# Patient Record
Sex: FEMALE | Race: WHITE | NOT HISPANIC OR LATINO | Employment: OTHER | URBAN - METROPOLITAN AREA
[De-identification: names, ages, dates, MRNs, and addresses within clinical notes are randomized per-mention and may not be internally consistent; named-entity substitution may affect disease eponyms.]

---

## 2022-06-06 ENCOUNTER — OFFICE VISIT (OUTPATIENT)
Dept: CARDIOLOGY | Facility: CLINIC | Age: 72
End: 2022-06-06

## 2022-06-06 VITALS
HEIGHT: 68 IN | BODY MASS INDEX: 43.04 KG/M2 | SYSTOLIC BLOOD PRESSURE: 118 MMHG | DIASTOLIC BLOOD PRESSURE: 68 MMHG | WEIGHT: 284 LBS | OXYGEN SATURATION: 98 % | HEART RATE: 74 BPM

## 2022-06-06 DIAGNOSIS — Z01.810 PREOPERATIVE CARDIOVASCULAR EXAMINATION: Primary | ICD-10-CM

## 2022-06-06 DIAGNOSIS — E66.01 MORBID OBESITY WITH BMI OF 40.0-44.9, ADULT: ICD-10-CM

## 2022-06-06 DIAGNOSIS — E78.5 DYSLIPIDEMIA: ICD-10-CM

## 2022-06-06 DIAGNOSIS — R06.09 DOE (DYSPNEA ON EXERTION): ICD-10-CM

## 2022-06-06 DIAGNOSIS — R94.31 ABNORMAL EKG: ICD-10-CM

## 2022-06-06 DIAGNOSIS — M17.11 OSTEOARTHRITIS OF RIGHT KNEE, UNSPECIFIED OSTEOARTHRITIS TYPE: ICD-10-CM

## 2022-06-06 PROBLEM — Z01.818 PRE-OP EXAMINATION: Status: ACTIVE | Noted: 2022-06-06

## 2022-06-06 PROCEDURE — 99204 OFFICE O/P NEW MOD 45 MIN: CPT | Performed by: NURSE PRACTITIONER

## 2022-06-06 PROCEDURE — 93000 ELECTROCARDIOGRAM COMPLETE: CPT | Performed by: NURSE PRACTITIONER

## 2022-06-06 RX ORDER — TOPIRAMATE 25 MG/1
TABLET ORAL
COMMUNITY
Start: 2022-04-15

## 2022-06-06 RX ORDER — ATORVASTATIN CALCIUM 20 MG/1
TABLET, FILM COATED ORAL
COMMUNITY
Start: 2022-04-27

## 2022-06-06 RX ORDER — LEVOTHYROXINE SODIUM 0.03 MG/1
TABLET ORAL
COMMUNITY
Start: 2022-04-27

## 2022-06-06 RX ORDER — TRAZODONE HYDROCHLORIDE 150 MG/1
TABLET ORAL
COMMUNITY
Start: 2022-04-11

## 2022-06-06 NOTE — PROGRESS NOTES
Cardiology Office New Patient Visit      Primary Care Provider:  Luis Daniel Eckert MD    Reason for Visit:     Preoperative cardiovascular risk assessment requested      Subjective     CC: Denies chest pain, complains of dyspnea with exertion    History of Present Illness       Laura Cowart is a 72 y.o. female.  Patient is a very pleasant 72-year-old female who is not known to have coronary artery disease, previous MI or heart failure.    She presents to our office today requesting preoperative cardiovascular risk assessment prior to upcoming scheduled right total knee replacement on Indy 15 by Dr. Rodgers    Patient reports previous noninvasive ischemic evaluation more than 20 years ago but none recently.    Cardiovascular risk factors include obesity, dyslipidemia.  There is a family history of arrhythmia    Patient reports her functional status is significantly limited due to her knee pain.  She does complain of dyspnea with low levels of exertion.  She denies chest pain            ASSESSMENT/PLAN:        Diagnoses and all orders for this visit:    1. Preoperative cardiovascular examination (Primary)  Comments:  Preoperative cardiovascular risk of placement requested prior to upcoming right total knee replacement  Orders:  -     ECG 12 Lead  -     Stress Test With Myocardial Perfusion (1 Day); Future    2. Osteoarthritis of right knee, unspecified osteoarthritis type    3. Abnormal EKG  Comments:  Twelve-lead EKG showing sinus rhythm with no ST or T wave segment abnormalities.  There is a borderline left axis deviation  Orders:  -     ECG 12 Lead  -     Stress Test With Myocardial Perfusion (1 Day); Future    4. MEHTA (dyspnea on exertion)  Comments:  Complaints of dyspnea with mild levels of exertion  Orders:  -     ECG 12 Lead  -     Stress Test With Myocardial Perfusion (1 Day); Future    5. Dyslipidemia  Comments:  Treated with statin therapy    6. Morbid obesity with BMI of 40.0-44.9, adult  (HCC)        MEDICAL DECISION MAKING:    Patient is requesting preoperative cardiovascular risk assessment prior to upcoming scheduled right total knee replacement.    She complains of some dyspnea with low levels of exertion.  Her functional status is reduced due to knee pain.  She is not having chest pain at rest.    Cardiovascular risk factors include dyslipidemia, obesity and there is a family history of some arrhythmias.    Twelve-lead EKG shows sinus rhythm with a borderline left axis deviation.  There is no acute ST or T wave segment abnormalities.    Due to her symptoms of dyspnea with exertion and cardiovascular risk factors I would recommend proceeding with Lexiscan Myoview to rule out an ischemic burden prior to surgery.  She would be unable to walk on a treadmill due to her degenerative joint disease in the knee.    Cardiovascular clearance will be made pending review of the stress test which is scheduled for this         Past Medical History:   Diagnosis Date   • Hyperlipidemia        Past Surgical History:   Procedure Laterality Date   •  SECTION     • CHOLECYSTECTOMY     • TONSILLECTOMY           Current Outpatient Medications:   •  atorvastatin (LIPITOR) 20 MG tablet, , Disp: , Rfl:   •  levothyroxine (SYNTHROID, LEVOTHROID) 25 MCG tablet, , Disp: , Rfl:   •  topiramate (TOPAMAX) 25 MG tablet, , Disp: , Rfl:   •  traZODone (DESYREL) 150 MG tablet, , Disp: , Rfl:     Social History     Socioeconomic History   • Marital status:    Tobacco Use   • Smoking status: Former Smoker     Quit date: 1970     Years since quittin.4   • Smokeless tobacco: Never Used   Vaping Use   • Vaping Use: Never used   Substance and Sexual Activity   • Alcohol use: Yes     Comment: once a month   • Drug use: Not Currently   • Sexual activity: Defer       Family History   Problem Relation Age of Onset   • Arrhythmia Mother    • Heart attack Mother    • Heart disease Mother        The  "following portions of the patient's history were reviewed and updated as appropriate: allergies, current medications, past family history, past medical history, past social history, past surgical history and problem list.    Review of Systems   Constitutional: Negative for decreased appetite and diaphoresis.   HENT: Negative for congestion, hearing loss and nosebleeds.    Cardiovascular: Positive for dyspnea on exertion and palpitations. Negative for chest pain, claudication, irregular heartbeat, leg swelling, near-syncope, orthopnea, paroxysmal nocturnal dyspnea and syncope.   Respiratory: Negative for cough, shortness of breath and sleep disturbances due to breathing.    Endocrine: Negative for polyuria.   Hematologic/Lymphatic: Does not bruise/bleed easily.   Skin: Negative for itching and rash.   Musculoskeletal: Positive for arthritis and joint pain. Negative for back pain, muscle weakness and myalgias.   Gastrointestinal: Negative for abdominal pain, change in bowel habit and nausea.   Genitourinary: Negative for dysuria, flank pain, frequency and hesitancy.   Neurological: Negative for dizziness, tremors and weakness.   Psychiatric/Behavioral: Negative for altered mental status. The patient does not have insomnia.        /68 (BP Location: Left arm, Patient Position: Sitting, Cuff Size: Large Adult)   Pulse 74   Ht 172.7 cm (68\")   Wt 129 kg (284 lb)   SpO2 98%   BMI 43.18 kg/m² .    Body mass index is 43.18 kg/m².    Objective     Vitals reviewed.   Constitutional:       General: Not in acute distress.     Appearance: Normal appearance. Well-developed.   Eyes:      Pupils: Pupils are equal, round, and reactive to light.   HENT:      Head: Normocephalic and atraumatic.   Neck:      Vascular: No JVD.   Pulmonary:      Effort: Pulmonary effort is normal.      Breath sounds: Normal breath sounds.   Cardiovascular:      Normal rate. Regular rhythm.      Comments: Peripheral obesity but no overt edema " noted    Ambulates with a cane  Pulses:     Intact distal pulses.   Edema:     Peripheral edema absent.   Abdominal:      General: There is no distension.      Palpations: Abdomen is soft.      Tenderness: There is no abdominal tenderness.   Musculoskeletal: Normal range of motion.      Cervical back: Normal range of motion and neck supple. Skin:     General: Skin is warm and dry.   Neurological:      Mental Status: Alert and oriented to person, place, and time.             ECG 12 Lead    Date/Time: 6/6/2022 2:34 PM  Performed by: Dorota Lincoln APRN  Authorized by: Dorota Lincoln APRN   Comparison: not compared with previous ECG   Rhythm: sinus rhythm  BPM: 74  Conduction: conduction normal  ST Segments: ST segments normal  T Waves: T waves normal  QRS axis: left  Other: no other findings    Clinical impression: non-specific ECG

## 2022-06-08 ENCOUNTER — APPOINTMENT (OUTPATIENT)
Dept: NUCLEAR MEDICINE | Facility: HOSPITAL | Age: 72
End: 2022-06-08

## 2022-06-08 ENCOUNTER — TELEPHONE (OUTPATIENT)
Dept: CARDIOLOGY | Facility: CLINIC | Age: 72
End: 2022-06-08

## 2022-06-08 NOTE — TELEPHONE ENCOUNTER
She was in to see you on Monday for cardiac clearance  You wanted her to get a stress test  They are having trouble with the insurance approving this and was told to schedule it out for two weeks  Her surgery is scheduled for June 15th  Does she really need this stress test  She has had two Normal Ekgs in the last week  She doesn't want her surgery postponed

## 2022-06-09 NOTE — PROGRESS NOTES
Patient was seen earlier in the week for preoperative cardiovascular risk assessment appointment prior to upcoming right total knee replacement.    Stress Myoview was ordered due to patient's age, risk factors for heart disease and no prior noninvasive ischemic evaluation.    There has been difficulty with the patient's insurance having the stress test approved and was told she was out of network at [facility] for this test.    Discussion with the patient regarding deferring surgery and proceeding with stress test at another facility versus proceeding with surgery as scheduled next week and her understanding that she has moderate risk due to her comorbidities.    Patient does not wish to defer surgery.    Will send a clearance letter to Dr. Almodovar which states the patient is moderate risk for perioperative complications due to comorbidities but may proceed with knee surgery as planned.

## 2022-06-10 ENCOUNTER — APPOINTMENT (OUTPATIENT)
Dept: NUCLEAR MEDICINE | Facility: HOSPITAL | Age: 72
End: 2022-06-10

## 2022-06-10 ENCOUNTER — PATIENT ROUNDING (BHMG ONLY) (OUTPATIENT)
Dept: CARDIOLOGY | Facility: CLINIC | Age: 72
End: 2022-06-10

## 2022-06-10 NOTE — PROGRESS NOTES
Indy 10, 2022    Hello, may I speak with Laura Cowart?    My name is Rosey Garland    I am  with [facility]  [address] [place] [address].    Before we get started may I verify your date of birth? 1950    I am calling to officially welcome you to our practice and ask about your recent visit. Is this a good time to talk? yes    Tell me about your visit with us. What things went well? Everything went well and everyone was great       We're always looking for ways to make our patients' experiences even better. Do you have recommendations on ways we may improve?  no    Overall were you satisfied with your first visit to our practice? yes       I appreciate you taking the time to speak with me today. Is there anything else I can do for you? no      Thank you, and have a great day.

## 2023-09-25 ENCOUNTER — TRANSCRIBE ORDERS (OUTPATIENT)
Dept: ADMINISTRATIVE | Facility: HOSPITAL | Age: 73
End: 2023-09-25

## 2023-09-25 DIAGNOSIS — R06.09 DOE (DYSPNEA ON EXERTION): Primary | ICD-10-CM
